# Patient Record
Sex: MALE | Race: WHITE
[De-identification: names, ages, dates, MRNs, and addresses within clinical notes are randomized per-mention and may not be internally consistent; named-entity substitution may affect disease eponyms.]

---

## 2019-11-23 ENCOUNTER — HOSPITAL ENCOUNTER (EMERGENCY)
Dept: HOSPITAL 47 - EC | Age: 44
Discharge: HOME | End: 2019-11-23
Payer: COMMERCIAL

## 2019-11-23 VITALS — HEART RATE: 73 BPM | SYSTOLIC BLOOD PRESSURE: 141 MMHG | DIASTOLIC BLOOD PRESSURE: 89 MMHG | TEMPERATURE: 98.2 F

## 2019-11-23 VITALS — RESPIRATION RATE: 18 BRPM

## 2019-11-23 DIAGNOSIS — Z82.49: ICD-10-CM

## 2019-11-23 DIAGNOSIS — R00.1: ICD-10-CM

## 2019-11-23 DIAGNOSIS — D72.829: ICD-10-CM

## 2019-11-23 DIAGNOSIS — J98.09: ICD-10-CM

## 2019-11-23 DIAGNOSIS — R07.89: Primary | ICD-10-CM

## 2019-11-23 LAB
ANION GAP SERPL CALC-SCNC: 8 MMOL/L
APTT BLD: 22.9 SEC (ref 22–30)
BASOPHILS # BLD AUTO: 0.2 K/UL (ref 0–0.2)
BASOPHILS NFR BLD AUTO: 1 %
BUN SERPL-SCNC: 12 MG/DL (ref 9–20)
CALCIUM SPEC-MCNC: 10 MG/DL (ref 8.4–10.2)
CHLORIDE SERPL-SCNC: 106 MMOL/L (ref 98–107)
CO2 SERPL-SCNC: 27 MMOL/L (ref 22–30)
EOSINOPHIL # BLD AUTO: 0.5 K/UL (ref 0–0.7)
EOSINOPHIL NFR BLD AUTO: 4 %
ERYTHROCYTE [DISTWIDTH] IN BLOOD BY AUTOMATED COUNT: 5.01 M/UL (ref 4.3–5.9)
ERYTHROCYTE [DISTWIDTH] IN BLOOD: 12.8 % (ref 11.5–15.5)
GLUCOSE SERPL-MCNC: 99 MG/DL (ref 74–99)
HCT VFR BLD AUTO: 46.6 % (ref 39–53)
HGB BLD-MCNC: 16.1 GM/DL (ref 13–17.5)
INR PPP: 0.9 (ref ?–1.2)
LYMPHOCYTES # SPEC AUTO: 2.7 K/UL (ref 1–4.8)
LYMPHOCYTES NFR SPEC AUTO: 23 %
MAGNESIUM SPEC-SCNC: 2 MG/DL (ref 1.6–2.3)
MCH RBC QN AUTO: 32.1 PG (ref 25–35)
MCHC RBC AUTO-ENTMCNC: 34.5 G/DL (ref 31–37)
MCV RBC AUTO: 93 FL (ref 80–100)
MONOCYTES # BLD AUTO: 0.9 K/UL (ref 0–1)
MONOCYTES NFR BLD AUTO: 7 %
NEUTROPHILS # BLD AUTO: 7.5 K/UL (ref 1.3–7.7)
NEUTROPHILS NFR BLD AUTO: 62 %
PLATELET # BLD AUTO: 281 K/UL (ref 150–450)
POTASSIUM SERPL-SCNC: 4.1 MMOL/L (ref 3.5–5.1)
PT BLD: 10.1 SEC (ref 9–12)
SODIUM SERPL-SCNC: 141 MMOL/L (ref 137–145)
WBC # BLD AUTO: 12 K/UL (ref 3.8–10.6)

## 2019-11-23 PROCEDURE — 99285 EMERGENCY DEPT VISIT HI MDM: CPT

## 2019-11-23 PROCEDURE — 85025 COMPLETE CBC W/AUTO DIFF WBC: CPT

## 2019-11-23 PROCEDURE — 85610 PROTHROMBIN TIME: CPT

## 2019-11-23 PROCEDURE — 36415 COLL VENOUS BLD VENIPUNCTURE: CPT

## 2019-11-23 PROCEDURE — 80048 BASIC METABOLIC PNL TOTAL CA: CPT

## 2019-11-23 PROCEDURE — 84484 ASSAY OF TROPONIN QUANT: CPT

## 2019-11-23 PROCEDURE — 93005 ELECTROCARDIOGRAM TRACING: CPT

## 2019-11-23 PROCEDURE — 83735 ASSAY OF MAGNESIUM: CPT

## 2019-11-23 PROCEDURE — 85730 THROMBOPLASTIN TIME PARTIAL: CPT

## 2019-11-23 PROCEDURE — 71046 X-RAY EXAM CHEST 2 VIEWS: CPT

## 2019-11-23 NOTE — XR
EXAMINATION TYPE: XR chest 2V

 

DATE OF EXAM: 11/23/2019

 

COMPARISON: None

 

HISTORY: 44-year-old male with pain

 

TECHNIQUE:  PA and lateral views

 

FINDINGS:  

Low lung volumes and crowded vascular markings. Heart upper limits of normal in size. Mild peribronch
ial cuffing is noted. No consolidation or pleural effusion.

 

 

IMPRESSION:  

 

1. Borderline heart size.

2. Mild peribronchial cuffing and interstitial prominence could reflect bronchitis or asthma. No foca
l infiltrate seen.

## 2019-11-23 NOTE — ED
General Adult HPI





- General


Chief complaint: Chest Pain


Stated complaint: Chest pain


Time Seen by Provider: 11/23/19 15:35


Source: patient


Mode of arrival: ambulatory


Limitations: no limitations





- History of Present Illness


Initial comments: 





Dictation was produced using dragon dictation software. please excuse any 

grammatical, word or spelling errors. 





Chief Complaint: 44-year-old male presents with chest pain. 





 History of Present Illness: A 4-year-old male presents today with chest pain.  

Patient states the pain is like a pressure-like sensation to his left anterior 

chest.  Denies any radiation of the jar upper Jean's.  Not associated with 

diaphoresis.  Patient states his pain is worse when lying flat compared to when 

sitting up.  Patient states pain is so severe that will come out of his sleep 

today.  Having these symptoms intermittently for the last week.  No recent URIs 

or sicknesses.  He initially thought his symptoms were secondary to indigestion.

 Patient has a cardiac history.  He does complain of some family history.








The ROS documented in this emergency department record has been reviewed and c

onfirmed by me.  Those systems with pertinent positive or negative responses 

have been documented in the HPI.  All other systems are other negative and/or 

noncontributory.








PHYSICAL EXAM:


General Impression: Alert and oriented x3, not in acute distress


HEENT: Normocephalic atraumatic, extra-ocular movements intact, pupils equal and

reactive to light bilaterally, mucous membranes moist.


Cardiovascular: Heart regular rate and rhythm, S1&S2 audible, no murmurs, rubs 

or gallops


Chest: Lungs clear to auscultation bilaterally, no rhonchi, no wheeze, no rales


Abdomen: Bowel sounds present, abdomen soft, non-tender, non-distended, no 

organomegaly


Musculoskeletal: Pulses present and equal in all extremities, no peripheral 

edema


Motor:  no focal deficits noted


Neurological: CN II-XII grossly intact, no focal motor or sensory deficits noted


Skin: Intact with no visualized rashes


Psych: Normal affect and mood





ED course: 44-year-old male presents with atypical chest pain.  Signs upon 

arrival are within acceptable limits.  EKG does not show any signs of STEMI.  No

EKG findings of pericarditis.


Laboratory evaluation obtained.  Mild leukocytosis of 12.0.  Coag panel 

unremarkable.  Metabolic panel is negative.  Chest x-ray shows mild 

peribronchial cuffing.  Negative troponin.  No concern for myocarditis.  

Patient's symptoms are atypical.  He is advised follow-up with primary care 

physician.  Return parameters discussed.  Patient clear for discharge.  All 

questions answered.  Patient is reevaluated bedside.  He does not have any high-

risk features.  





EKG interpretation: Ventricular rate 54, bradycardia, AK interval 140, care is 

108, . No AK prolongation, no QTC prolongation, no ST or T-wave changes 

noted. .  Overall, this EKG is unremarkable








- Related Data


                                    Allergies











Allergy/AdvReac Type Severity Reaction Status Date / Time


 


No Known Allergies Allergy   Verified 11/23/19 15:28














Review of Systems


ROS Statement: 


Those systems with pertinent positive or pertinent negative responses have been 

documented in the HPI.





ROS Other: All systems not noted in ROS Statement are negative.





Past Medical History


Past Medical History: No Reported History


History of Any Multi-Drug Resistant Organisms: None Reported


Past Surgical History: Hernia Repair


Smoking Status: Never smoker


Past Alcohol Use History: Occasional


Past Drug Use History: Unable to Obtain





General Exam


Limitations: no limitations





Course


                                   Vital Signs











  11/23/19 11/23/19





  15:25 16:17


 


Temperature 98.5 F 


 


Pulse Rate 69 77


 


Respiratory 18 18





Rate  


 


Blood Pressure 160/99 132/95


 


O2 Sat by Pulse 100 97





Oximetry  














Medical Decision Making





- Lab Data


Result diagrams: 


                                 11/23/19 16:15





                                 11/23/19 16:15


                                   Lab Results











  11/23/19 11/23/19 11/23/19 Range/Units





  16:15 16:15 16:15 


 


WBC   12.0 H   (3.8-10.6)  k/uL


 


RBC   5.01   (4.30-5.90)  m/uL


 


Hgb   16.1   (13.0-17.5)  gm/dL


 


Hct   46.6   (39.0-53.0)  %


 


MCV   93.0   (80.0-100.0)  fL


 


MCH   32.1   (25.0-35.0)  pg


 


MCHC   34.5   (31.0-37.0)  g/dL


 


RDW   12.8   (11.5-15.5)  %


 


Plt Count   281   (150-450)  k/uL


 


Neutrophils %   62   %


 


Lymphocytes %   23   %


 


Monocytes %   7   %


 


Eosinophils %   4   %


 


Basophils %   1   %


 


Neutrophils #   7.5   (1.3-7.7)  k/uL


 


Lymphocytes #   2.7   (1.0-4.8)  k/uL


 


Monocytes #   0.9   (0-1.0)  k/uL


 


Eosinophils #   0.5   (0-0.7)  k/uL


 


Basophils #   0.2   (0-0.2)  k/uL


 


PT    10.1  (9.0-12.0)  sec


 


INR    0.9  (<1.2)  


 


APTT    22.9  (22.0-30.0)  sec


 


Sodium  141    (137-145)  mmol/L


 


Potassium  4.1    (3.5-5.1)  mmol/L


 


Chloride  106    ()  mmol/L


 


Carbon Dioxide  27    (22-30)  mmol/L


 


Anion Gap  8    mmol/L


 


BUN  12    (9-20)  mg/dL


 


Creatinine  0.82    (0.66-1.25)  mg/dL


 


Est GFR (CKD-EPI)AfAm  >90    (>60 ml/min/1.73 sqM)  


 


Est GFR (CKD-EPI)NonAf  >90    (>60 ml/min/1.73 sqM)  


 


Glucose  99    (74-99)  mg/dL


 


Calcium  10.0    (8.4-10.2)  mg/dL


 


Magnesium  2.0    (1.6-2.3)  mg/dL


 


Troponin I     (0.000-0.034)  ng/mL














  11/23/19 Range/Units





  16:15 


 


WBC   (3.8-10.6)  k/uL


 


RBC   (4.30-5.90)  m/uL


 


Hgb   (13.0-17.5)  gm/dL


 


Hct   (39.0-53.0)  %


 


MCV   (80.0-100.0)  fL


 


MCH   (25.0-35.0)  pg


 


MCHC   (31.0-37.0)  g/dL


 


RDW   (11.5-15.5)  %


 


Plt Count   (150-450)  k/uL


 


Neutrophils %   %


 


Lymphocytes %   %


 


Monocytes %   %


 


Eosinophils %   %


 


Basophils %   %


 


Neutrophils #   (1.3-7.7)  k/uL


 


Lymphocytes #   (1.0-4.8)  k/uL


 


Monocytes #   (0-1.0)  k/uL


 


Eosinophils #   (0-0.7)  k/uL


 


Basophils #   (0-0.2)  k/uL


 


PT   (9.0-12.0)  sec


 


INR   (<1.2)  


 


APTT   (22.0-30.0)  sec


 


Sodium   (137-145)  mmol/L


 


Potassium   (3.5-5.1)  mmol/L


 


Chloride   ()  mmol/L


 


Carbon Dioxide   (22-30)  mmol/L


 


Anion Gap   mmol/L


 


BUN   (9-20)  mg/dL


 


Creatinine   (0.66-1.25)  mg/dL


 


Est GFR (CKD-EPI)AfAm   (>60 ml/min/1.73 sqM)  


 


Est GFR (CKD-EPI)NonAf   (>60 ml/min/1.73 sqM)  


 


Glucose   (74-99)  mg/dL


 


Calcium   (8.4-10.2)  mg/dL


 


Magnesium   (1.6-2.3)  mg/dL


 


Troponin I  <0.012  (0.000-0.034)  ng/mL














Disposition


Clinical Impression: 


 Chest pain





Disposition: HOME SELF-CARE


Condition: Good


Instructions (If sedation given, give patient instructions):  Chest Pain (ED)


Is patient prescribed a controlled substance at d/c from ED?: No


Referrals: 


Yousif Brennan MD [Primary Care Provider] - 1-2 days


Time of Disposition: 16:53

## 2022-04-30 ENCOUNTER — HOSPITAL ENCOUNTER (OUTPATIENT)
Dept: HOSPITAL 47 - LABWHC1 | Age: 47
Discharge: HOME | End: 2022-04-30
Attending: SURGERY
Payer: COMMERCIAL

## 2022-04-30 DIAGNOSIS — R94.31: Primary | ICD-10-CM

## 2022-04-30 PROCEDURE — 93005 ELECTROCARDIOGRAM TRACING: CPT

## 2022-04-30 PROCEDURE — 36415 COLL VENOUS BLD VENIPUNCTURE: CPT

## 2022-06-06 ENCOUNTER — HOSPITAL ENCOUNTER (OUTPATIENT)
Dept: HOSPITAL 47 - ORWHC2ENDO | Age: 47
Discharge: HOME | End: 2022-06-06
Attending: SURGERY
Payer: COMMERCIAL

## 2022-06-06 VITALS — SYSTOLIC BLOOD PRESSURE: 112 MMHG | RESPIRATION RATE: 16 BRPM | DIASTOLIC BLOOD PRESSURE: 78 MMHG

## 2022-06-06 VITALS — HEART RATE: 61 BPM

## 2022-06-06 VITALS — TEMPERATURE: 97.7 F

## 2022-06-06 VITALS — BODY MASS INDEX: 30.1 KG/M2

## 2022-06-06 DIAGNOSIS — Z79.899: ICD-10-CM

## 2022-06-06 DIAGNOSIS — K21.00: Primary | ICD-10-CM

## 2022-06-06 DIAGNOSIS — K64.4: ICD-10-CM

## 2022-06-06 DIAGNOSIS — K64.3: ICD-10-CM

## 2022-06-06 DIAGNOSIS — Z98.890: ICD-10-CM

## 2022-06-06 DIAGNOSIS — K57.31: ICD-10-CM

## 2022-06-06 DIAGNOSIS — Z87.891: ICD-10-CM

## 2022-06-06 DIAGNOSIS — K25.0: ICD-10-CM

## 2022-06-06 DIAGNOSIS — K29.51: ICD-10-CM

## 2022-06-06 DIAGNOSIS — Z12.11: ICD-10-CM

## 2022-06-06 DIAGNOSIS — I10: ICD-10-CM

## 2022-06-06 PROCEDURE — 88305 TISSUE EXAM BY PATHOLOGIST: CPT

## 2022-06-06 PROCEDURE — 45380 COLONOSCOPY AND BIOPSY: CPT

## 2022-06-06 PROCEDURE — 43239 EGD BIOPSY SINGLE/MULTIPLE: CPT

## 2022-06-06 NOTE — P.PCN
Date of Procedure: 06/06/22


Description of Procedure: 








PREOPERATIVE DIAGNOSIS:


Gastroesophageal reflux disease.





POSTOPERATIVE DIAGNOSIS:


Acute gastric ulcers with bleeding


Gastritis with bleeding





OPERATION:


Esophagogastroduodenoscopy with biopsies along antrum.





SURGEON: Missy Lees MD





ANESTHESIA: MAC.





INDICATIONS:


The patient is a 46-year-old male who presents with  reflux disease. Benefits 

and risks of the procedure were described. Informed consent was obtained.





DESCRIPTION:


The patient was brought into the endoscopy suite and laid in the left lateral 

decubitus position. An Olympus gastroscope was passed along the posterior 

oropharynx down to the distal esophagus where the squamocolumnar junction was 

encountered at 42 cm from the incisors. The stomach was entered and no bile 

reflux was found.  Additional findings are listed below. Biopsies with cold 

forceps were obtained of the antrum. The first through third portion of the 

duodenum was examined. Retroflexion of the scope confirmed  Hill grade 3 lower 

esophageal valve. The squamocolumnar junction demonstrated LA grade A erosive 

esophagitis. The stomach was desufflated. The patient tolerated the procedure 

well.





FINDINGS:


Squamocolumnar junction 42 cm from the incisors.


Diaphragmatic hiatus at 42 cm.


Hill grade 3 lower esophageal valve.


LA grade A erosive esophagitis.


Acute gastric ulcers 3, 3 mm with recent bleeding at the angularis incisura and

proximal


No active duodenitis.


Chronic gastritis with recent bleed





RECOMMENDATIONS:


1.  Repeat upper endoscopy in 2 months


2.  Discontinue NSAIDs


3.  Protonix 40 mg daily


4.  Carafate 1 g 3 times daily

## 2022-06-06 NOTE — P.HPADDEND
H&P Addendum


H&P Addendum Date: 06/06/22





Upper endoscopy for uncontrolled gastroesophageal reflux disease despite 

medications ongoing for over 6 months.

## 2022-06-06 NOTE — P.GSHP
History of Present Illness


H&P Date: 06/06/22














CHIEF COMPLAINT: Colon screen





HISTORY OF PRESENT ILLNESS: The patient is a 46-year-old male who


presents for colon screen.  Lower endoscopy was offered for further evaluation 

and management.





PAST MEDICAL HISTORY: 


Please see list.





PAST SURGICAL HISTORY: 


Please see list.





MEDICATIONS: 


Please see list.





ALLERGIES:  Please see list. 





SOCIAL HISTORY: No illicit drug use





FAMILY HISTORY: No reports of Crohn disease or ulcerative colitis. 





REVIEW OF ORGAN SYSTEMS: 


CONSTITUTIONAL: No reports of fevers or chills.  





PHYSICAL EXAM: 


VITAL SIGNS:  Stable


GENERAL: Well-developed pleasant in no acute distress. 


HEENT: No scleral icterus. Extraocular movements grossly


intact. Moist buccal mucosa. 


NECK: Supple without lymphadenopathy. 


CHEST: Unlabored respirations. Equal bilateral excursions. 


CARDIOVASCULAR: Regular rate and rhythm. Distal 2+ pulses. 


ABDOMEN: Soft, nontender, nondistended. 


MUSCULOSKELETAL: No clubbing, cyanosis, or edema. 





ASSESSMENT: 


1.  Colon screen.





PLAN: 


1. Recommend proceeding with a lower endoscopy





Past Medical History


Past Medical History: GERD/Reflux, Hypertension


Additional Past Medical History / Comment(s): no rx for BP, umbilical and 

inguinal hernia,


History of Any Multi-Drug Resistant Organisms: None Reported


Past Surgical History: Hernia Repair


Additional Past Surgical History / Comment(s): rt inguinal hernia repair,


Past Anesthesia/Blood Transfusion Reactions: No Reported Reaction


Smoking Status: Former smoker





- Past Family History


  ** Mother


Family Medical History: No Reported History





Medications and Allergies


                                Home Medications











 Medication  Instructions  Recorded  Confirmed  Type


 


Ibuprofen [Advil] 200 mg PO Q8HR PRN 06/02/22 06/02/22 History








                                    Allergies











Allergy/AdvReac Type Severity Reaction Status Date / Time


 


No Known Allergies Allergy   Verified 06/02/22 15:31

## 2022-06-06 NOTE — P.PCN
Date of Procedure: 06/06/22


Description of Procedure: 




















PREOPERATIVE DIAGNOSIS:


Colonoscopy screening





POSTOPERATIVE DIAGNOSIS:


Cecum colon polyp


Descending colon polyp


Sigmoid diverticulosis


Internal hemorrhoids, grade 4


External hemorrhoid, grade 4





OPERATION:


Colonoscopy to the ileocecal valve and appendiceal orifice, cecum


Colonoscopy with cold forceps biopsy





SURGEON: Missy Lees MD.





ANESTHESIA: MAC.





INDICATIONS:


The patient is an 46-year-old male who presents  for his first colonoscopy 

screening Benefits and risks were described and informed consent was obtained.





DESCRIPTION OF PROCEDURE:


The patient had undergone Sutab prep.  The patient had been brought into the 

operating room and laid in the left lateral decubitus position.  After adequate 

intravenous sedation, the rectum was examined with 2% lidocaine jelly. The 

prostate was unremarkable.  External hemorrhoids were encountered. The rectal 

tone was within normal limits. No lesions were palpated in the rectal vault. An 

Olympus colonoscope was advanced until the cecum, ileocecal valve and 

appendiceal orifice were clearly viewed.  The prep was excellent.  Sigmoid 

diverticulosis was encountered.  Colonic polyps were found and removed.  No 

evidence of focal colitis was found. Retroflexion of the scope demonstrated 

grade 4 internal hemorrhoids without active bleeding or inflammation. The colon 

was desufflated. The patient had tolerated the procedure well. 





Withdrawal time was over 6 minutes.





FINDINGS:


Aronchick preparation quality scale 1 (1-5)


Internal hemorrhoids, grade 4 with recent inflammation 


External hemorrhoids, grade 4.


No arteriovenous malformations.


Sigmoid diverticulosis


Removal of 2 polyps:


- Cold forceps biopsy at cecum, 4 mm polyp.


- Cold forceps biopsy at 50 cm from the anal verge, 3 mm polyp, descending colon


No focal colitis.





RECOMMENDATIONS:


Repeat colonoscopy in 3 years, 2025 





Plan - Discharge Summary


New Discharge Prescriptions: 


New


   Sucralfate [Carafate] 1 gm PO TID #60 tablet


   Omeprazole [PriLOSEC] 40 mg PO DAILY #30 cap





Discontinued


   Ibuprofen [Advil] 200 mg PO Q8HR PRN


     PRN Reason: Pain


Discharge Medication List





Omeprazole [PriLOSEC] 40 mg PO DAILY #30 cap 06/06/22 [Rx]


Sucralfate [Carafate] 1 gm PO TID #60 tablet 06/06/22 [Rx]








Follow up Appointment(s)/Referral(s): 


Missy Lees MD [STAFF PHYSICIAN] - 06/21/22


Patient Instructions/Handouts:  Diverticulosis Diet (GEN), Diverticulosis (DC), 

Hemorrhoids (DC), Colorectal Polyps (GEN)


Activity/Diet/Wound Care/Special Instructions: 


Repeat colonoscopy in 3 years, 2025


Discharge Disposition: HOME SELF-CARE

## 2025-06-07 ENCOUNTER — HOSPITAL ENCOUNTER (OUTPATIENT)
Dept: HOSPITAL 47 - LABWHC1 | Age: 50
Discharge: HOME | End: 2025-06-07
Attending: FAMILY MEDICINE
Payer: COMMERCIAL

## 2025-06-07 DIAGNOSIS — Z12.5: Primary | ICD-10-CM

## 2025-06-07 DIAGNOSIS — N52.9: ICD-10-CM

## 2025-06-07 DIAGNOSIS — R03.0: ICD-10-CM

## 2025-06-07 LAB
ALBUMIN SERPL-MCNC: 4.6 G/DL (ref 3.8–4.9)
ALBUMIN/GLOB SERPL: 2 RATIO (ref 1.6–3.17)
ALP SERPL-CCNC: 64 U/L (ref 41–126)
ALT SERPL-CCNC: 27 U/L (ref 10–49)
ANION GAP SERPL CALC-SCNC: 12.6 MMOL/L (ref 4–12)
AST SERPL-CCNC: 22 U/L (ref 14–35)
BILIRUB BLD-MCNC: 1.1 MG/DL (ref 0.3–1.2)
BUN SERPL-SCNC: 12.8 MG/DL (ref 9–27)
BUN/CREAT SERPL: 12.8 RATIO (ref 12–20)
CALCIUM SPEC-MCNC: 9.5 MG/DL (ref 8.7–10.3)
CHLORIDE SERPL-SCNC: 102 MMOL/L (ref 96–109)
CHOLEST SERPL-MCNC: 240 MG/DL (ref 0–200)
CHOLEST/HDLC SERPL: 7.06 RATIO
CO2 SERPL-SCNC: 24.4 MMOL/L (ref 21.6–31.8)
CREATININE: 1 MG/DL (ref 0.6–1.5)
EST. AVERAGE GLUCOSE BLD GHB EST-MCNC: 114 MG/DL
GLOBULIN SER CALC-MCNC: 2.3 G/DL (ref 1.6–3.3)
GLUCOSE SERPL-MCNC: 93 MG/DL (ref 70–110)
HDLC SERPL-MCNC: 34 MG/DL (ref 40–60)
LDLC SERPL CALC-MCNC: 144.6 MG/DL (ref 0–131)
LDLC SERPL DIRECT ASSAY-MCNC: (no result) MG/DL
POTASSIUM SERPL-SCNC: 4.8 MMOL/L (ref 3.5–5.5)
PROT SERPL-MCNC: 6.9 G/DL (ref 6.2–8.2)
PSA SERPL-MCNC: 0.43 NG/ML (ref 0–4)
SODIUM SERPL-SCNC: 139 MMOL/L (ref 135–145)
TESTOST SERPL-MCNC: 274 NG/DL (ref 86.98–780.1)
TRIGL SERPL-MCNC: 307 MG/DL (ref 0–149)
TSH SERPL DL<=0.005 MIU/L-ACNC: 0.98 UIU/ML (ref 0.35–5.5)
VLDLC SERPL CALC-MCNC: 61.4 MG/DL (ref 5–40)

## 2025-06-07 PROCEDURE — 80061 LIPID PANEL: CPT

## 2025-06-07 PROCEDURE — 84403 ASSAY OF TOTAL TESTOSTERONE: CPT

## 2025-06-07 PROCEDURE — 80053 COMPREHEN METABOLIC PANEL: CPT

## 2025-06-07 PROCEDURE — 84443 ASSAY THYROID STIM HORMONE: CPT

## 2025-06-07 PROCEDURE — 36415 COLL VENOUS BLD VENIPUNCTURE: CPT

## 2025-06-07 PROCEDURE — 83036 HEMOGLOBIN GLYCOSYLATED A1C: CPT
